# Patient Record
Sex: FEMALE | Race: WHITE | HISPANIC OR LATINO | Employment: FULL TIME | URBAN - METROPOLITAN AREA
[De-identification: names, ages, dates, MRNs, and addresses within clinical notes are randomized per-mention and may not be internally consistent; named-entity substitution may affect disease eponyms.]

---

## 2023-12-05 ENCOUNTER — OFFICE VISIT (OUTPATIENT)
Dept: OBSTETRICS AND GYNECOLOGY | Facility: CLINIC | Age: 48
End: 2023-12-05
Payer: COMMERCIAL

## 2023-12-05 VITALS
DIASTOLIC BLOOD PRESSURE: 80 MMHG | HEIGHT: 61 IN | SYSTOLIC BLOOD PRESSURE: 106 MMHG | WEIGHT: 196.5 LBS | BODY MASS INDEX: 37.1 KG/M2

## 2023-12-05 DIAGNOSIS — Z01.419 WOMEN'S ANNUAL ROUTINE GYNECOLOGICAL EXAMINATION: Primary | ICD-10-CM

## 2023-12-05 DIAGNOSIS — R92.8 ABNORMAL MAMMOGRAM: ICD-10-CM

## 2023-12-05 DIAGNOSIS — B96.89 BACTERIAL VAGINOSIS: ICD-10-CM

## 2023-12-05 DIAGNOSIS — N76.0 BACTERIAL VAGINOSIS: ICD-10-CM

## 2023-12-05 DIAGNOSIS — Z11.3 SCREEN FOR STD (SEXUALLY TRANSMITTED DISEASE): ICD-10-CM

## 2023-12-05 PROCEDURE — 87800 DETECT AGNT MULT DNA DIREC: CPT

## 2023-12-05 PROCEDURE — 99396 PREV VISIT EST AGE 40-64: CPT | Performed by: ADVANCED PRACTICE MIDWIFE

## 2023-12-05 PROCEDURE — 4004F PT TOBACCO SCREEN RCVD TLK: CPT | Performed by: ADVANCED PRACTICE MIDWIFE

## 2023-12-05 PROCEDURE — 87661 TRICHOMONAS VAGINALIS AMPLIF: CPT

## 2023-12-05 RX ORDER — METRONIDAZOLE 7.5 MG/G
GEL VAGINAL DAILY
Qty: 70 G | Refills: 0 | Status: SHIPPED | OUTPATIENT
Start: 2023-12-05 | End: 2023-12-10

## 2023-12-05 RX ORDER — CHOLECALCIFEROL (VITAMIN D3) 25 MCG
2000 TABLET ORAL
COMMUNITY
End: 2023-12-05 | Stop reason: WASHOUT

## 2023-12-05 RX ORDER — LEVOTHYROXINE SODIUM 112 UG/1
112 TABLET ORAL DAILY
COMMUNITY

## 2023-12-05 RX ORDER — FUROSEMIDE 40 MG/1
40 TABLET ORAL DAILY
COMMUNITY

## 2023-12-05 RX ORDER — DEXTROAMPHETAMINE SACCHARATE, AMPHETAMINE ASPARTATE, DEXTROAMPHETAMINE SULFATE AND AMPHETAMINE SULFATE 2.5; 2.5; 2.5; 2.5 MG/1; MG/1; MG/1; MG/1
1 TABLET ORAL DAILY
COMMUNITY

## 2023-12-05 RX ORDER — DEXTROAMPHETAMINE SACCHARATE, AMPHETAMINE ASPARTATE MONOHYDRATE, DEXTROAMPHETAMINE SULFATE AND AMPHETAMINE SULFATE 2.5; 2.5; 2.5; 2.5 MG/1; MG/1; MG/1; MG/1
10 CAPSULE, EXTENDED RELEASE ORAL EVERY MORNING
COMMUNITY
End: 2023-12-05 | Stop reason: WASHOUT

## 2023-12-05 RX ORDER — B-COMPLEX WITH VITAMIN C
TABLET ORAL
COMMUNITY
End: 2023-12-05 | Stop reason: WASHOUT

## 2023-12-05 RX ORDER — VENLAFAXINE HYDROCHLORIDE 75 MG/1
75 CAPSULE, EXTENDED RELEASE ORAL DAILY
COMMUNITY
Start: 2023-11-08

## 2023-12-05 ASSESSMENT — ENCOUNTER SYMPTOMS
CONSTITUTIONAL NEGATIVE: 0
NEUROLOGICAL NEGATIVE: 0
ALLERGIC/IMMUNOLOGIC NEGATIVE: 0
MUSCULOSKELETAL NEGATIVE: 0
HEMATOLOGIC/LYMPHATIC NEGATIVE: 0
GASTROINTESTINAL NEGATIVE: 0
PSYCHIATRIC NEGATIVE: 0
ENDOCRINE NEGATIVE: 0
RESPIRATORY NEGATIVE: 0
CARDIOVASCULAR NEGATIVE: 0
EYES NEGATIVE: 0

## 2023-12-05 NOTE — PROGRESS NOTES
"Subjective   Obdulia Henry is a 48 y.o. female who presents for annual exam. The patient has no complaints today. The patient is (rarely) sexually active - 1 male partner, and has no concern for exposure to sexually transmitted infections. Patient denies pain or bleeding with sex. She has no vaginal odor or irritation today.   Low sexual pleasure, difficult weight loss, hot flashes. Concerned about HRT because doesn't want to risk mood stability.    Periods: absent    OBGYN hx:   - Contraception: IUD w/progesterone - Patient denies a personal history of migraine with aura, hypertension and blood clots.     Social History     Tobacco Use   Smoking Status Not on file   Smokeless Tobacco Not on file      - GYN screening history: last pap:  WNL, HPV neg , last mammogram: was abnormal: left side, small mass was cyst. Repeat in March of this year had shrunken, but having close follow up due to family history .   History of abnormal Pap smear: yes - History of abnormal pap with LEEP. Last pap 10/28/20 -> ASCUS, HPV neg . History of abnormal mammogram: yes - has needed repeat imaging and has had consult with breast surgery .  - Family history of uterine or ovarian cancer: UNKNOWN. Family history of breast cancer: yes    Social hx:  - Living situation: alone, quarterly has mental health concerns, Occupation:  for adults with developmental disabilities x12 years   - Smoking 1PPD. and other toxic habits    Review of Systems     Objective   /80   Ht 1.549 m (5' 1\")   Wt 89.1 kg (196 lb 8 oz)   BMI 37.13 kg/m²     General:   alert and oriented, in no acute distress   Heart: regular rate and rhythm   Lungs: clear to auscultation bilaterally   Abdomen: soft, non-tender, without masses or organomegaly   Vulva: normal   Vagina: normal mucosa   Cervix: no lesions and IUD strings appreciated   Uterus: normal size   Adnexa: normal adnexa     Assessment/Plan   1) Health maintenance:   Pap: Up to " date; due 2025  Self breast exam encouraged - follow up mammogram ordered. Concerning characteristics of breasts reviewed - patient will call office with general concerns, any adherent lumps, skin dimpling/puckering or color changes, and any nipple discharge.  Routine follow up with PCP for health maintenance examination encouraged including TSH, cholesterol and vitamin D evaluation.    2) Contraception:  Lyletta; risk factors reviewed and patient meets criteria to continue on this method. Reviewed current Lyletta guidelines from their website of use x6 years -> will be due for removal 2024. Contraceptive options reviewed and information provided.     3) STD screening: accepted cultures     4) Depression and anxiety  Intermittently stable on meds  Works closely with PCP  Always frustrated by difficulty to be seen by providers due to no new patients, cost and wait times.  Obdulia to go to ED with SI/HI. Denies these today.    5) Weight loss  Through weight watchers and exercise   Keep up the good work!    6) Hx BV  Rx for metrogel sent to pharmacy per patient request    7) Vasomotor symptoms   Menonotes provided  Information about menopause hormone therapy discussed  Patient will continue to monitor    8) Smoker  Not ready to quit    Meg Gillette CNM

## 2023-12-06 LAB
C TRACH RRNA SPEC QL NAA+PROBE: NEGATIVE
N GONORRHOEA DNA SPEC QL PROBE+SIG AMP: NEGATIVE
T VAGINALIS RRNA SPEC QL NAA+PROBE: NEGATIVE

## 2023-12-07 ENCOUNTER — TELEPHONE (OUTPATIENT)
Dept: OBSTETRICS AND GYNECOLOGY | Facility: CLINIC | Age: 48
End: 2023-12-07
Payer: COMMERCIAL

## 2023-12-07 NOTE — TELEPHONE ENCOUNTER
----- Message from AMILCAR Villasenor sent at 12/7/2023 12:42 PM EST -----  Negative results for GC/CT and trich. Please call patient to notify.  Thanks,  AMILCAR Villasenor

## 2023-12-14 ENCOUNTER — APPOINTMENT (OUTPATIENT)
Dept: RADIOLOGY | Facility: HOSPITAL | Age: 48
End: 2023-12-14
Payer: COMMERCIAL

## 2023-12-21 ENCOUNTER — HOSPITAL ENCOUNTER (OUTPATIENT)
Dept: RADIOLOGY | Facility: HOSPITAL | Age: 48
Discharge: HOME | End: 2023-12-21
Payer: COMMERCIAL

## 2023-12-21 DIAGNOSIS — R92.8 ABNORMAL MAMMOGRAM: ICD-10-CM

## 2023-12-21 PROCEDURE — 77062 BREAST TOMOSYNTHESIS BI: CPT

## 2023-12-21 PROCEDURE — 77066 DX MAMMO INCL CAD BI: CPT | Performed by: RADIOLOGY

## 2023-12-21 PROCEDURE — 77062 BREAST TOMOSYNTHESIS BI: CPT | Performed by: RADIOLOGY

## 2024-05-29 ENCOUNTER — TELEPHONE (OUTPATIENT)
Dept: OBSTETRICS AND GYNECOLOGY | Facility: CLINIC | Age: 49
End: 2024-05-29

## 2024-05-29 NOTE — TELEPHONE ENCOUNTER
GYN patient left message on ob line stating that she was seen in the ER on 5/26 for PID.  She was written off work through tomorrow but she is feeling fine and is ready to go back to work.  Patient is wondering how she can get a release to go back to work tomorrow.  Please advise

## 2024-06-18 ENCOUNTER — APPOINTMENT (OUTPATIENT)
Dept: OBSTETRICS AND GYNECOLOGY | Facility: CLINIC | Age: 49
End: 2024-06-18
Payer: COMMERCIAL

## 2024-06-18 VITALS
HEIGHT: 61 IN | DIASTOLIC BLOOD PRESSURE: 88 MMHG | SYSTOLIC BLOOD PRESSURE: 116 MMHG | BODY MASS INDEX: 35.4 KG/M2 | WEIGHT: 187.5 LBS

## 2024-06-18 DIAGNOSIS — N89.8 VAGINAL DISCHARGE: ICD-10-CM

## 2024-06-18 DIAGNOSIS — N73.0 PID (ACUTE PELVIC INFLAMMATORY DISEASE): ICD-10-CM

## 2024-06-18 DIAGNOSIS — A54.9 GONORRHEA: ICD-10-CM

## 2024-06-18 PROCEDURE — 99213 OFFICE O/P EST LOW 20 MIN: CPT | Performed by: ADVANCED PRACTICE MIDWIFE

## 2024-06-18 PROCEDURE — 87591 N.GONORRHOEAE DNA AMP PROB: CPT

## 2024-06-18 PROCEDURE — 87491 CHLMYD TRACH DNA AMP PROBE: CPT

## 2024-06-18 PROCEDURE — 87205 SMEAR GRAM STAIN: CPT

## 2024-06-18 RX ORDER — ERGOCALCIFEROL 1.25 MG/1
1.25 CAPSULE ORAL
COMMUNITY

## 2024-06-18 RX ORDER — MULTIVITAMIN/IRON/FOLIC ACID 18MG-0.4MG
1 TABLET ORAL DAILY
COMMUNITY

## 2024-06-18 RX ORDER — IRBESARTAN 75 MG/1
75 TABLET ORAL NIGHTLY
COMMUNITY

## 2024-06-18 RX ORDER — BUTYROSPERMUM PARKII(SHEA BUTTER), SIMMONDSIA CHINENSIS (JOJOBA) SEED OIL, ALOE BARBADENSIS LEAF EXTRACT .01; 1; 3.5 G/100G; G/100G; G/100G
250 LIQUID TOPICAL 2 TIMES DAILY
COMMUNITY

## 2024-06-18 ASSESSMENT — PATIENT HEALTH QUESTIONNAIRE - PHQ9
SUM OF ALL RESPONSES TO PHQ9 QUESTIONS 1 AND 2: 6
10. IF YOU CHECKED OFF ANY PROBLEMS, HOW DIFFICULT HAVE THESE PROBLEMS MADE IT FOR YOU TO DO YOUR WORK, TAKE CARE OF THINGS AT HOME, OR GET ALONG WITH OTHER PEOPLE: NOT DIFFICULT AT ALL
2. FEELING DOWN, DEPRESSED OR HOPELESS: NEARLY EVERY DAY
3. TROUBLE FALLING OR STAYING ASLEEP OR SLEEPING TOO MUCH: NEARLY EVERY DAY
6. FEELING BAD ABOUT YOURSELF - OR THAT YOU ARE A FAILURE OR HAVE LET YOURSELF OR YOUR FAMILY DOWN: NEARLY EVERY DAY
SUM OF ALL RESPONSES TO PHQ QUESTIONS 1-9: 27
7. TROUBLE CONCENTRATING ON THINGS, SUCH AS READING THE NEWSPAPER OR WATCHING TELEVISION: NEARLY EVERY DAY
1. LITTLE INTEREST OR PLEASURE IN DOING THINGS: NEARLY EVERY DAY
9. THOUGHTS THAT YOU WOULD BE BETTER OFF DEAD, OR OF HURTING YOURSELF: NEARLY EVERY DAY
5. POOR APPETITE OR OVEREATING: NEARLY EVERY DAY
8. MOVING OR SPEAKING SO SLOWLY THAT OTHER PEOPLE COULD HAVE NOTICED. OR THE OPPOSITE, BEING SO FIGETY OR RESTLESS THAT YOU HAVE BEEN MOVING AROUND A LOT MORE THAN USUAL: NEARLY EVERY DAY
4. FEELING TIRED OR HAVING LITTLE ENERGY: NEARLY EVERY DAY

## 2024-06-18 ASSESSMENT — ENCOUNTER SYMPTOMS
LOSS OF SENSATION IN FEET: 0
DEPRESSION: 0
OCCASIONAL FEELINGS OF UNSTEADINESS: 0

## 2024-06-18 NOTE — PROGRESS NOTES
"Subjective   Obdulia Henry is a 49 y.o. female who presents for PID follow up. Patient was diagnosed with GC and PID after going to ED about 3 weeks ago due to severe pain. She took antibiotics for 2 weeks, and then needed several doses of Diflucan to treat the associated yeast infection, but is now feeling a lot better.     Objective   /88   Ht 1.549 m (5' 1\")   Wt 85 kg (187 lb 8 oz)   LMP  (LMP Unknown)   BMI 35.43 kg/m²   Physical Exam  Constitutional:       General: She is not in acute distress.     Appearance: Normal appearance.   Genitourinary:      No lesions in the vagina.      Right Labia: No rash, tenderness or lesions.     Left Labia: No tenderness, lesions or rash.     No vaginal discharge or bleeding.        Right Adnexa: not tender and no mass present.     Left Adnexa: not tender and no mass present.     No cervical motion tenderness, discharge, lesion or polyp.      IUD strings visualized.      Uterus is not enlarged, fixed or tender.   HENT:      Head: Normocephalic and atraumatic.   Eyes:      Pupils: Pupils are equal, round, and reactive to light.   Cardiovascular:      Rate and Rhythm: Normal rate.   Pulmonary:      Effort: Pulmonary effort is normal.   Musculoskeletal:         General: Normal range of motion.      Cervical back: Normal range of motion and neck supple.   Neurological:      Mental Status: She is alert.   Skin:     General: Skin is warm and dry.   Psychiatric:         Mood and Affect: Mood normal.         Behavior: Behavior normal.         Thought Content: Thought content normal.   Vitals reviewed.         Assessment/Plan   for PID follow up  Problem List Items Addressed This Visit    None  Visit Diagnoses         Codes    PID (acute pelvic inflammatory disease)     N73.0    Relevant Orders    C. trachomatis + N. gonorrhoeae, Amplified    Gonorrhea     A54.9    Relevant Orders    C. trachomatis + N. gonorrhoeae, Amplified    Vaginal discharge     N89.8    " Relevant Orders    Vaginitis Gram Stain For Bacterial Vaginosis + Yeast        Repeat testing ordered  Discussed Liletta IUD removal and replacement later this year to treat heavy periods; OK for contraception for up to 8 years.   Warning s/s discussed  All questions answered    AMILCAR Villasenor 06/18/24

## 2024-06-19 LAB
C TRACH RRNA SPEC QL NAA+PROBE: NEGATIVE
CLUE CELLS VAG LPF-#/AREA: NORMAL /[LPF]
N GONORRHOEA DNA SPEC QL PROBE+SIG AMP: NEGATIVE
NUGENT SCORE: 0
YEAST VAG WET PREP-#/AREA: NORMAL

## 2024-07-15 ENCOUNTER — TELEPHONE (OUTPATIENT)
Dept: OBSTETRICS AND GYNECOLOGY | Facility: CLINIC | Age: 49
End: 2024-07-15
Payer: COMMERCIAL

## 2024-07-15 NOTE — TELEPHONE ENCOUNTER
Pt contracted STI in May. Finished meds. Retested negative mid June but is having pain in abdomen and hip and cloudiness in urine. Please advise if pt should receive another round of meds or come in to be seen.

## 2024-07-18 ENCOUNTER — OFFICE VISIT (OUTPATIENT)
Dept: OBSTETRICS AND GYNECOLOGY | Facility: CLINIC | Age: 49
End: 2024-07-18
Payer: COMMERCIAL

## 2024-07-18 VITALS
SYSTOLIC BLOOD PRESSURE: 113 MMHG | DIASTOLIC BLOOD PRESSURE: 79 MMHG | WEIGHT: 202.38 LBS | BODY MASS INDEX: 38.24 KG/M2

## 2024-07-18 DIAGNOSIS — R21 SKIN RASH: ICD-10-CM

## 2024-07-18 DIAGNOSIS — R10.2 PELVIC PAIN: Primary | ICD-10-CM

## 2024-07-18 DIAGNOSIS — N89.8 VAGINAL ITCHING: ICD-10-CM

## 2024-07-18 DIAGNOSIS — Z11.3 SCREEN FOR STD (SEXUALLY TRANSMITTED DISEASE): ICD-10-CM

## 2024-07-18 PROCEDURE — 87661 TRICHOMONAS VAGINALIS AMPLIF: CPT

## 2024-07-18 PROCEDURE — 99214 OFFICE O/P EST MOD 30 MIN: CPT

## 2024-07-18 PROCEDURE — 87591 N.GONORRHOEAE DNA AMP PROB: CPT

## 2024-07-18 PROCEDURE — 87491 CHLMYD TRACH DNA AMP PROBE: CPT

## 2024-07-18 PROCEDURE — 87205 SMEAR GRAM STAIN: CPT

## 2024-07-18 RX ORDER — FLUCONAZOLE 150 MG/1
150 TABLET ORAL DAILY
Qty: 1 TABLET | Refills: 1 | Status: SHIPPED | OUTPATIENT
Start: 2024-07-18

## 2024-07-18 NOTE — PROGRESS NOTES
Subjective   Patient ID: Obdulia Henry is a 49 y.o. female who presents for Abdominal Pain (Obdulia is here to follow up to low abdominal/ ovary, hip pain (mostly on the right side). She has bumps on her low abdomen, and states that she is very itchy. With her history she is concerned. ).    HPI  Patient presents to the office today with concern for right sided lower abdominal pain, starts in her hip and radiates down towards right lower abdomen/pelvis; seen in office by SHERRY Gillette CNM 6/2024 for PID follow-up, was treated in ED.  Had negative STI and vaginitis testing at that time.  States that her current symptoms have been going on for approximately 1 week.  Also having slight vaginal discharge, though denies vaginal itching or odor.  Concern for skin bumps underneath/near her belly that she noticed this morning that are itchy.  Used hydrocortisone cream with relief.  Not currently sexually active, just began seeing someone new.  Wants to make sure that STI testing is still negative.    Review of Systems   Genitourinary:  Positive for pelvic pain and vaginal discharge.   All other systems reviewed and are negative.      Objective   Physical Exam  Constitutional:       Appearance: Normal appearance.   HENT:      Head: Normocephalic.   Pulmonary:      Effort: Pulmonary effort is normal.   Abdominal:      Comments: Small area underneath/near stomach in right lower quadrant of scattered, red bumps; not fungal in appearance.  No surrounding redness or edema.  No discharge or drainage.   Genitourinary:     General: Normal vulva.      Vagina: Normal.      Cervix: Normal.      Comments: IUD strings visualized.  Skin:     General: Skin is warm and dry.   Neurological:      Mental Status: She is alert and oriented to person, place, and time.   Psychiatric:         Mood and Affect: Mood normal.         Assessment/Plan   Diagnoses and all orders for this visit:  Skin rash       -  Continued use of OTC hydrocortisone  "cream.  If worsening, or without relief, will change to OTC Lotrimin.  Consider dermatology referral if no improvement.  Vaginal itching  -     Vaginitis Gram Stain For Bacterial Vaginosis + Yeast  -     Will await results prior to treatment; patient requesting a dose of Diflucan be sent to her pharmacy \"to have on hand\" in the case that she were to need it in the future.  Screen for STD (sexually transmitted disease)  -     C. trachomatis + N. gonorrhoeae, Amplified  -     Trichomonas vaginalis, Amplified  Pelvic pain        - Declines recommendation for pelvic US.    Will await results prior to treatment.       Fern De La Cruz, JUAN DANIEL-JOYCE 07/18/24 8:11 AM   "

## 2024-07-20 LAB
CLUE CELLS VAG LPF-#/AREA: NORMAL /[LPF]
NUGENT SCORE: 0
YEAST VAG WET PREP-#/AREA: NORMAL

## 2024-07-26 ENCOUNTER — TELEPHONE (OUTPATIENT)
Dept: OBSTETRICS AND GYNECOLOGY | Facility: CLINIC | Age: 49
End: 2024-07-26
Payer: COMMERCIAL

## 2024-07-26 NOTE — TELEPHONE ENCOUNTER
----- Message from Meg Gillette sent at 7/22/2024  3:20 PM EDT -----  Please call patient to notify that her lab results were all normal - no additional follow up at this time.

## 2024-10-18 ENCOUNTER — APPOINTMENT (OUTPATIENT)
Dept: OBSTETRICS AND GYNECOLOGY | Facility: CLINIC | Age: 49
End: 2024-10-18
Payer: COMMERCIAL

## 2024-12-10 ENCOUNTER — APPOINTMENT (OUTPATIENT)
Dept: OBSTETRICS AND GYNECOLOGY | Facility: CLINIC | Age: 49
End: 2024-12-10
Payer: COMMERCIAL

## 2024-12-16 ENCOUNTER — APPOINTMENT (OUTPATIENT)
Dept: OBSTETRICS AND GYNECOLOGY | Facility: CLINIC | Age: 49
End: 2024-12-16
Payer: COMMERCIAL

## 2025-01-23 ENCOUNTER — APPOINTMENT (OUTPATIENT)
Dept: OBSTETRICS AND GYNECOLOGY | Facility: CLINIC | Age: 50
End: 2025-01-23
Payer: COMMERCIAL

## 2025-01-23 ENCOUNTER — LAB (OUTPATIENT)
Dept: LAB | Facility: LAB | Age: 50
End: 2025-01-23
Payer: COMMERCIAL

## 2025-01-23 VITALS — WEIGHT: 208 LBS | BODY MASS INDEX: 40.84 KG/M2 | HEIGHT: 60 IN

## 2025-01-23 DIAGNOSIS — Z01.419 WOMEN'S ANNUAL ROUTINE GYNECOLOGICAL EXAMINATION: Primary | ICD-10-CM

## 2025-01-23 DIAGNOSIS — Z11.51 SCREENING FOR HUMAN PAPILLOMAVIRUS (HPV): ICD-10-CM

## 2025-01-23 DIAGNOSIS — Z12.4 PAP SMEAR FOR CERVICAL CANCER SCREENING: ICD-10-CM

## 2025-01-23 DIAGNOSIS — Z12.31 SCREENING MAMMOGRAM FOR BREAST CANCER: ICD-10-CM

## 2025-01-23 DIAGNOSIS — Z80.3 FAMILY HISTORY OF BREAST CANCER: ICD-10-CM

## 2025-01-23 PROCEDURE — 87624 HPV HI-RISK TYP POOLED RSLT: CPT

## 2025-01-23 PROCEDURE — 9990000009 MISCELLANEOUS GENETICS TEST

## 2025-01-23 ASSESSMENT — ENCOUNTER SYMPTOMS
GASTROINTESTINAL NEGATIVE: 1
PSYCHIATRIC NEGATIVE: 1
CARDIOVASCULAR NEGATIVE: 1
CONSTITUTIONAL NEGATIVE: 1
RESPIRATORY NEGATIVE: 1
NEUROLOGICAL NEGATIVE: 1
MUSCULOSKELETAL NEGATIVE: 1
EYES NEGATIVE: 1
ENDOCRINE NEGATIVE: 1

## 2025-01-23 NOTE — PROGRESS NOTES
Subjective   Patient ID: Obdulia Henry is a 49 y.o. female who presents for New Patient Visit (Cleo was inserted 9/24/2018. She has no bleeding with IUD/Ascus PAP - HPV 10/28/2020/Normal PAP/ - HPV 5/19/2022).  49 year old here for annual exam without complaints.  She had a normal pap and neg hpv in 2022.  Her pap in 2020 was ASCUS with neg HPV.  She is due for her mammogram.  She denies any abnormal bleeding, pain, discharge, urinary changes and breast complaints.  She has a family history of breast cancer in her maternal aunt and her maternal cousin also had breast cancer at age 38.  She is interested in genetic testing.  Her mom passed young due to         Review of Systems   Constitutional: Negative.    HENT: Negative.     Eyes: Negative.    Respiratory: Negative.     Cardiovascular: Negative.    Gastrointestinal: Negative.    Endocrine: Negative.    Genitourinary: Negative.    Musculoskeletal: Negative.    Skin: Negative.    Neurological: Negative.    Psychiatric/Behavioral: Negative.         Objective   Physical Exam  Vitals reviewed.   Constitutional:       Appearance: Normal appearance. She is well-developed.   Pulmonary:      Effort: Pulmonary effort is normal. No respiratory distress.   Chest:   Breasts:     Breasts are symmetrical.      Right: Normal. No swelling, bleeding, inverted nipple, mass, nipple discharge, skin change or tenderness.      Left: Normal. No swelling, bleeding, inverted nipple, mass, nipple discharge, skin change or tenderness.   Abdominal:      Palpations: Abdomen is soft.   Genitourinary:     General: Normal vulva.      Exam position: Lithotomy position.      Pubic Area: No rash.       Labia:         Right: No rash, tenderness, lesion or injury.         Left: No rash, tenderness, lesion or injury.       Urethra: No prolapse, urethral pain, urethral swelling or urethral lesion.      Vagina: Normal.      Cervix: Normal.      Uterus: Normal.       Adnexa: Right adnexa normal  and left adnexa normal.      Rectum: Normal.   Musculoskeletal:         General: Normal range of motion.   Lymphadenopathy:      Upper Body:      Right upper body: No supraclavicular, axillary or pectoral adenopathy.      Left upper body: No supraclavicular, axillary or pectoral adenopathy.   Skin:     General: Skin is warm and dry.   Neurological:      General: No focal deficit present.      Mental Status: She is alert and oriented to person, place, and time. Mental status is at baseline.   Psychiatric:         Attention and Perception: Attention and perception normal.         Mood and Affect: Mood and affect normal.         Speech: Speech normal.         Behavior: Behavior normal. Behavior is cooperative.         Thought Content: Thought content normal.         Judgment: Judgment normal.         Assessment/Plan   Problem List Items Addressed This Visit    None  Visit Diagnoses         Codes    Women's annual routine gynecological examination    -  Primary Z01.419    Pap smear for cervical cancer screening     Z12.4    Relevant Orders    THINPREP PAP TEST    Screening for human papillomavirus (HPV)     Z11.51    Relevant Orders    THINPREP PAP TEST    Screening mammogram for breast cancer     Z12.31    Relevant Orders    BI mammo bilateral screening tomosynthesis    Family history of breast cancer     Z80.3    Relevant Orders    myrisk; [facility] - Miscellaneous Genetics Test          Pap/hpv sent  Mammogram ordered  Myrisk test ordered  Follow up 1 year for annual exam, sooner as needed if problems arise       JUAN DANIEL Chou-CNP 01/23/25 10:56 AM

## 2025-02-03 ENCOUNTER — TELEPHONE (OUTPATIENT)
Dept: OBSTETRICS AND GYNECOLOGY | Facility: CLINIC | Age: 50
End: 2025-02-03
Payer: COMMERCIAL

## 2025-02-03 LAB
CYTOLOGY CMNT CVX/VAG CYTO-IMP: NORMAL
HPV HR 12 DNA GENITAL QL NAA+PROBE: POSITIVE
HPV HR GENOTYPES PNL CVX NAA+PROBE: POSITIVE
HPV16 DNA SPEC QL NAA+PROBE: NEGATIVE
HPV18 DNA SPEC QL NAA+PROBE: NEGATIVE
LAB AP HPV GENOTYPE QUESTION: YES
LAB AP HPV HR: NORMAL
LABORATORY COMMENT REPORT: NORMAL
PATH REPORT.TOTAL CANCER: NORMAL

## 2025-02-03 NOTE — TELEPHONE ENCOUNTER
Results were reviewed with patient in detail. We discussed the colposcopy procedure. She was transferred scheduling.

## 2025-02-03 NOTE — TELEPHONE ENCOUNTER
----- Message from Earnestine Chou sent at 2/3/2025  3:40 PM EST -----  Please inform patient that her pap is LSIL with positive HPV.  She will need to return to the office with a physician for colposcopy

## 2025-02-19 ENCOUNTER — HOSPITAL ENCOUNTER (OUTPATIENT)
Dept: RADIOLOGY | Facility: CLINIC | Age: 50
Discharge: HOME | End: 2025-02-19
Payer: COMMERCIAL

## 2025-02-19 VITALS — WEIGHT: 208 LBS | HEIGHT: 60 IN | BODY MASS INDEX: 40.84 KG/M2

## 2025-02-19 DIAGNOSIS — Z12.31 SCREENING MAMMOGRAM FOR BREAST CANCER: ICD-10-CM

## 2025-02-19 PROCEDURE — 77063 BREAST TOMOSYNTHESIS BI: CPT | Performed by: RADIOLOGY

## 2025-02-19 PROCEDURE — 77067 SCR MAMMO BI INCL CAD: CPT | Performed by: RADIOLOGY

## 2025-02-19 PROCEDURE — 77067 SCR MAMMO BI INCL CAD: CPT

## 2025-02-28 PROBLEM — R87.612 LGSIL OF CERVIX OF UNDETERMINED SIGNIFICANCE: Status: ACTIVE | Noted: 2025-02-28

## 2025-02-28 PROBLEM — Z01.419 WELL WOMAN EXAM WITH ROUTINE GYNECOLOGICAL EXAM: Status: ACTIVE | Noted: 2025-02-28

## 2025-02-28 PROBLEM — Z97.5 IUD (INTRAUTERINE DEVICE) IN PLACE: Status: ACTIVE | Noted: 2025-02-28

## 2025-03-01 NOTE — ASSESSMENT & PLAN NOTE
Colposcopy was performed today. We will await pathology report from biopsies. If there is no evidence of moderate or severe dysplasia on pathology we will plan to repeat pap in 12 months.   Using a condom may help prevent exposure to new HPV strains.  Avoiding nicotine exposure and adopting healthy habits such as regular exercise and a healthy diet are also recommended.

## 2025-03-01 NOTE — PROGRESS NOTES
Patient ID: Obdulia Henry is a 49 y.o. female.    Colposcopy    Date/Time: 3/19/2025 8:49 AM    Performed by: Jane Bernabe MD  Authorized by: Jane Bernabe MD    Procedure location: cervix    Consent:     Patient questions answered: yes      Risks and benefits of the procedure and its alternatives discussed: yes      Procedural risks discussed:  Bleeding, infection and repeat procedure    Consent obtained:  Written    Consent given by:  Patient  Indication:     Cervical indication(s): high-risk HPV positive and RUTHY 1    Pre-procedure:     Speculum was placed in the vagina: yes      Prep solution(s): acetic acid    Procedure:     Colposcopy with: endocervical curettage      Colposcopy details:  No visible lesion. IUD string is noted.    Cervix visibility: fully visualized      SCJ visibility: not fully visualized      Cervical impression: normal/benign    Post-procedure:     Patient tolerance of procedure:  Patient tolerated the procedure well with no immediate complications  Subjective   Patient ID: Obdulia Henry is a 49 y.o. female who presents for Colposcopy.  She presents for gyn visit for colposcopy.  Annual exam was on 1/23/2025 with Ana Luisa Chou. Pap that date returned with LGSIL and presence of high risk HPV.   Pap and HPV were negative in 2022.     She did have genetic testing ordered that day due to FH of breast cancer in her maternal aunt and cousin at age 38.   Liletta IUD was inserted in 2018.           Review of Systems   Constitutional:  Negative for activity change.   HENT:  Negative for congestion.    Respiratory:  Negative for apnea and cough.    Cardiovascular:  Negative for chest pain.   Gastrointestinal:  Negative for constipation and diarrhea.   Genitourinary:  Negative for hematuria and vaginal pain.   Musculoskeletal:  Negative for joint swelling.   Neurological:  Negative for dizziness.   Psychiatric/Behavioral:  Negative for agitation.        Past Medical History:    Diagnosis Date    Acute vaginitis 04/28/2021    BV (bacterial vaginosis)    BRCA1 negative 2025    BRCA2 negative 2025    Prediabetes     Weight watchers and exercise helped    Weight loss     Down 60lbs with weight watchers and exercise      Past Surgical History:   Procedure Laterality Date    OTHER SURGICAL HISTORY  03/18/2021    Knee surgery    OTHER SURGICAL HISTORY  03/18/2021    Cholecystectomy      Allergies   Allergen Reactions    Ace Inhibitors Cough, Rash and Unknown     Other reaction(s): Cough, Other (See Comments)   Other reaction(s): Cough, Cough, Other (see comments), Other: See Comments    Other reaction(s): Cough, Cough, Other (see comments), Other: See Comments    Benzoyl Peroxide Hives, Rash, Swelling and Other    Benzoin Rash    Hydromorphone Hives, Itching, Other and Rash     Other reaction(s): Other (see comments), Unknown    Morphine Anxiety, Hallucinations, Other and Unknown     Other reaction(s): Mental Status Change   Other reaction(s): Mental Status Change    Other reaction(s): Mental Status Change      Current Outpatient Medications on File Prior to Visit   Medication Sig Dispense Refill    amphetamine-dextroamphetamine (Adderall) 10 mg tablet Take 1 tablet (10 mg) by mouth once daily.      b complex 0.4 mg tablet Take 1 tablet by mouth once daily.      ergocalciferol (Vitamin D-2) 1.25 MG (92126 UT) capsule Take 1 capsule (1,250 mcg) by mouth 1 (one) time per week.      furosemide (Lasix) 40 mg tablet Take 1 tablet (40 mg) by mouth once daily.      irbesartan (Avapro) 75 mg tablet Take 1 tablet (75 mg) by mouth once daily at bedtime.      levothyroxine (Synthroid, Levoxyl) 112 mcg tablet Take 1 tablet (112 mcg) by mouth once daily.      venlafaxine XR (Effexor-XR) 75 mg 24 hr capsule Take 1 capsule (75 mg) by mouth once daily.      fluconazole (Diflucan) 150 mg tablet Take 1 tablet (150 mg) by mouth once daily. Repeat in 3 days if no relief. (Patient not taking: Reported on 1/23/2025)  1 tablet 1    levonorgestreL (Liletta) 20.1 mcg/24 hr intrauterine device 52 mg by intrauterine route 1 time. Placed in 2018      saccharomyces boulardii (Florastor) 250 mg capsule Take 1 capsule (250 mg) by mouth 2 times a day. (Patient not taking: Reported on 1/23/2025)       No current facility-administered medications on file prior to visit.        Objective   Physical Exam  Constitutional:       Appearance: Normal appearance.   Abdominal:      Palpations: Abdomen is soft.   Genitourinary:     General: Normal vulva.      Exam position: Lithotomy position.      Labia:         Right: No lesion.         Left: No lesion.       Urethra: No urethral lesion.      Vagina: Normal. No lesions.      Cervix: No friability or lesion.      Uterus: Normal. Not enlarged and not tender.       Adnexa: Right adnexa normal and left adnexa normal.        Right: No mass or tenderness.          Left: No mass or tenderness.     Skin:     General: Skin is warm and dry.   Neurological:      Mental Status: She is alert.           Problem List Items Addressed This Visit       LGSIL of cervix of undetermined significance - Primary    Overview     1/23/2025 pap returned LGSIL with high risk HPV present.  3/19/2025 colposcopy is without visible lesion. ECC is performed.         Current Assessment & Plan     Colposcopy was performed today. We will await pathology report from biopsies. If there is no evidence of moderate or severe dysplasia on pathology we will plan to repeat pap in 12 months.   Using a condom may help prevent exposure to new HPV strains.  Avoiding nicotine exposure and adopting healthy habits such as regular exercise and a healthy diet are also recommended.

## 2025-03-19 ENCOUNTER — APPOINTMENT (OUTPATIENT)
Dept: OBSTETRICS AND GYNECOLOGY | Facility: CLINIC | Age: 50
End: 2025-03-19
Payer: COMMERCIAL

## 2025-03-19 VITALS — SYSTOLIC BLOOD PRESSURE: 118 MMHG | WEIGHT: 214 LBS | DIASTOLIC BLOOD PRESSURE: 82 MMHG | BODY MASS INDEX: 41.79 KG/M2

## 2025-03-19 DIAGNOSIS — R87.612 LGSIL OF CERVIX OF UNDETERMINED SIGNIFICANCE: Primary | ICD-10-CM

## 2025-03-19 PROCEDURE — 57456 ENDOCERV CURETTAGE W/SCOPE: CPT | Performed by: OBSTETRICS & GYNECOLOGY

## 2025-03-19 RX ORDER — LEVONORGESTREL 52 MG/1
1 INTRAUTERINE DEVICE INTRAUTERINE ONCE
COMMUNITY

## 2025-03-19 ASSESSMENT — ENCOUNTER SYMPTOMS
CONSTIPATION: 0
AGITATION: 0
APNEA: 0
HEMATURIA: 0
COUGH: 0
JOINT SWELLING: 0
ACTIVITY CHANGE: 0
DIARRHEA: 0
DIZZINESS: 0

## 2025-03-26 LAB
LABORATORY COMMENT REPORT: NORMAL
PATH REPORT.FINAL DX SPEC: NORMAL
PATH REPORT.GROSS SPEC: NORMAL
PATH REPORT.RELEVANT HX SPEC: NORMAL
PATH REPORT.TOTAL CANCER: NORMAL

## 2025-04-01 ENCOUNTER — TELEPHONE (OUTPATIENT)
Dept: OBSTETRICS AND GYNECOLOGY | Facility: CLINIC | Age: 50
End: 2025-04-01
Payer: COMMERCIAL

## 2025-04-01 NOTE — TELEPHONE ENCOUNTER
----- Message from Jane Bernabe sent at 3/26/2025  4:33 PM EDT -----  Pathology returned benign. We will keep plan for pap in 1 year.

## 2025-06-10 ENCOUNTER — APPOINTMENT (OUTPATIENT)
Dept: OBSTETRICS AND GYNECOLOGY | Facility: CLINIC | Age: 50
End: 2025-06-10
Payer: COMMERCIAL

## 2025-06-11 ENCOUNTER — APPOINTMENT (OUTPATIENT)
Dept: OBSTETRICS AND GYNECOLOGY | Facility: CLINIC | Age: 50
End: 2025-06-11
Payer: COMMERCIAL

## 2026-01-21 ENCOUNTER — APPOINTMENT (OUTPATIENT)
Dept: OBSTETRICS AND GYNECOLOGY | Facility: CLINIC | Age: 51
End: 2026-01-21
Payer: COMMERCIAL